# Patient Record
(demographics unavailable — no encounter records)

---

## 2024-12-13 NOTE — ASSESSMENT
[FreeTextEntry1] : take Augmentin as directed with food use Fluticasone nasal spray as directed maintain fluid hydration monitor heart rate at home

## 2024-12-13 NOTE — REVIEW OF SYSTEMS
[Cough] : cough [Fever] : no fever [Chills] : no chills [Shortness Of Breath] : no shortness of breath [FreeTextEntry4] : sinus congestion

## 2024-12-13 NOTE — PHYSICAL EXAM
[No Acute Distress] : no acute distress [Well Nourished] : well nourished [Well Developed] : well developed [Normal Appearance] : was normal in appearance [Neck Supple] : was supple [No Respiratory Distress] : no respiratory distress  [Clear to Auscultation] : lungs were clear to auscultation bilaterally [Regular Rhythm] : with a regular rhythm [Normal S1, S2] : normal S1 and S2 [No Murmur] : no murmur heard [Normal Anterior Cervical Nodes] : no anterior cervical lymphadenopathy [Alert and Oriented x3] : oriented to person, place, and time [Tachycardia] : tachycardic [de-identified] : Bilateral frontal and maxillary sinus tenderness; Oropharynx with mild erythema, no exudated, postnasal drip present

## 2024-12-13 NOTE — HISTORY OF PRESENT ILLNESS
[FreeTextEntry8] : 27 year old  presents c/o 12 day h/o sinus congestion and pressure along with a cough. No fever or chills. She has noticed her cough continues to be productive, sometimes feels chest tightness where she has needed to use her Albuterol inhaler at home. She did go to [facility] urgent care last week on 12/5/24- rapid strep test came back negative. Her home COVID testing has been negative. She was prescribed cough medication which provided mild relief.

## 2024-12-19 NOTE — ASSESSMENT
[FreeTextEntry1] : Pt evaluated and symptoms consistent with URI  discussed viral vs bacterial infections and discussed OTC treatment options including rest, fluids, hydration, motrin/tylenol PRN  Use stream inhalation and neti pot PRN discussed OTC remedies given work note f/u iif needed

## 2024-12-19 NOTE — REVIEW OF SYSTEMS
[Fever] : no fever [Fatigue] : fatigue [Nasal Discharge] : nasal discharge [Sore Throat] : sore throat [Cough] : cough

## 2024-12-19 NOTE — HISTORY OF PRESENT ILLNESS
[Home] : at home, [unfilled] , at the time of the visit. [Medical Office: ([facility])___] : at the medical office located in  [Verbal consent obtained from patient] : the patient, [unfilled] [FreeTextEntry8] : PT presenting for f.u of URI  was seen earlier in the week with similar symptoms, initially had a fever which has now subsided has cough which is persistent and bothersome needs note for RTW

## 2024-12-23 NOTE — ASSESSMENT
[FreeTextEntry1] : given persistent cough- referred for chest x-ray to r/o pneumonia will prescribe course of alternative antibiotics, take Azithromycin as directed with probiotics  complete course of Medrol dose pack c/w Fluticasone nasal spray

## 2024-12-23 NOTE — REVIEW OF SYSTEMS
[Postnasal Drip] : postnasal drip [Cough] : cough [Fever] : no fever [Chills] : no chills [Chest Pain] : no chest pain [Shortness Of Breath] : no shortness of breath

## 2024-12-23 NOTE — HISTORY OF PRESENT ILLNESS
[FreeTextEntry8] : 27 year old female presents c/o persistent cough. She was seen in the office 10 days ago- prescribed Augmentin and Fluticasone nasal spray for likely sinusitis. She has completed her antibiotics 4 days ago. She had a telehealth appointment 3 days ago- was prescribed a Medrol dose pack, on day #4 today.

## 2024-12-23 NOTE — PHYSICAL EXAM
[No Acute Distress] : no acute distress [Well Nourished] : well nourished [Well Developed] : well developed [Normal Oropharynx] : the oropharynx was normal [Normal TMs] : both tympanic membranes were normal [Normal Appearance] : was normal in appearance [Neck Supple] : was supple [No Respiratory Distress] : no respiratory distress  [Clear to Auscultation] : lungs were clear to auscultation bilaterally [Regular Rhythm] : with a regular rhythm [Normal S1, S2] : normal S1 and S2 [No Murmur] : no murmur heard [Tachycardia] : tachycardic [Normal Anterior Cervical Nodes] : no anterior cervical lymphadenopathy [Alert and Oriented x3] : oriented to person, place, and time [de-identified] : postnasal drip present [de-identified] : + cough

## 2025-01-31 NOTE — HISTORY OF PRESENT ILLNESS
[FreeTextEntry8] : 27 year old female presents for evaluation of a cough. She had been diagnosed with the Flu about 2 weeks ago- she had gone to an urgent care after developing a fever along with cough and congestion. She had been treating herself with over the counter medication including Dayquil, Nyquil, Ibuprofen. Most of her symptoms have resolved except she remains with a persistent cough and mild congestion. She had similar cough about a month ago- that eventually resolved and she had been well for a couple of weeks prior to nam the Flu. She continues to use Fluticasone nasal spray.

## 2025-01-31 NOTE — ASSESSMENT
[FreeTextEntry1] : s/p recent Flu, c/o cough vitals, exam stable take Medrol dose pack as directed c/w Fluticasone nasal spray trial of Guaifenesin with codeine, take as directed when needed, advised on drowsy side effect, not to drive while taking medication,  reviewed call back or f/u if symptoms worsen and/or don't improve

## 2025-01-31 NOTE — PHYSICAL EXAM
[No Acute Distress] : no acute distress [Well Nourished] : well nourished [Well Developed] : well developed [Normal Oropharynx] : the oropharynx was normal [Normal TMs] : both tympanic membranes were normal [Normal Appearance] : was normal in appearance [Neck Supple] : was supple [No Respiratory Distress] : no respiratory distress  [Clear to Auscultation] : lungs were clear to auscultation bilaterally [Normal Rate] : normal rate  [Regular Rhythm] : with a regular rhythm [Normal S1, S2] : normal S1 and S2 [No Murmur] : no murmur heard [Normal Anterior Cervical Nodes] : no anterior cervical lymphadenopathy [Alert and Oriented x3] : oriented to person, place, and time [de-identified] : postnasal drip present

## 2025-04-02 NOTE — PLAN
[FreeTextEntry1] : discussed in detail treatment for left ovarian cyst  ca 125/CBC obtained  she would be a great candidate for LESS Left cystectomy  Risks and benefits discussed in detail. see scanned in image patient to follow up for final decision for surgery pending pre op sono  obtain hematology recommendations  all of her questions regarding procedure were answered she is agreeable with plan   I Marguerite Burgos [facility] am scribing for the presence of Dr. Shen the following sections HISTORY OF PRESENT ILLNESS, PAST MEDICAL/FAMILY/SOCIAL HISTORY; REVIEW OF SYSTEMS; VITAL SIGNS; PHYSICAL EXAM; DISPOSITION. I personally performed the services described in the documentation, reviewed the documentation recorded by the scribe in my presence and it accurately and completely records my words and actions.

## 2025-04-02 NOTE — HISTORY OF PRESENT ILLNESS
[FreeTextEntry1] : Patient is a 28yo  female here today for initial visit for surgical consult for ovarian cyst. TVUS done at [facility] 2025 left ovarian simple cyst 6.1cm.   Hx of tear duct surgery, prothrombin gene II

## 2025-04-02 NOTE — PHYSICAL EXAM
[Chaperone Present] : A chaperone was present in the examining room during all aspects of the physical examination [Labia Majora] : normal [Labia Minora] : normal [Normal] : normal [Uterine Adnexae] : normal [FreeTextEntry2] : Delmi FNP-BC [FreeTextEntry6] : left adnexal cystic mass smooth non tender

## 2025-04-02 NOTE — PLAN
[FreeTextEntry1] : discussed in detail treatment for left ovarian cyst  ca 125/CBC obtained  she would be a great candidate for LESS Left cystectomy  Risks and benefits discussed in detail. see scanned in image patient to follow up for final decision for surgery pending pre op sono  obtain hematology recommendations  all of her questions regarding procedure were answered she is agreeable with plan   I Marguerite Burgos [facility] am scribing for the presence of Dr. Shen the following sections HISTORY OF PRESENT ILLNESS, PAST MEDICAL/FAMILY/SOCIAL HISTORY; REVIEW OF SYSTEMS; VITAL SIGNS; PHYSICAL EXAM; DISPOSITION. I personally performed the services described in the documentation, reviewed the documentation recorded by the scribe in my presence and it accurately and completely records my words and actions.

## 2025-06-03 NOTE — PHYSICAL EXAM
[Appropriately responsive] : appropriately responsive [Alert] : alert [No Acute Distress] : no acute distress [No Lymphadenopathy] : no lymphadenopathy [Regular Rate Rhythm] : regular rate rhythm [No Murmurs] : no murmurs [Clear to Auscultation B/L] : clear to auscultation bilaterally [Soft] : soft [Non-tender] : non-tender [Non-distended] : non-distended [No HSM] : No HSM [No Lesions] : no lesions [No Mass] : no mass [Oriented x3] : oriented x3 [Labia Majora] : normal [Labia Minora] : normal [Normal] : normal [Uterine Adnexae] : normal [FreeTextEntry2] : Delmi FNP-BC [FreeTextEntry6] : left adnexal cystic mass smooth non tender

## 2025-06-03 NOTE — PLAN
[FreeTextEntry1] : plan LESS left ovarian cystectomy  Discussed pre op and post op instructions in detail zpack & oxycodone #4 ordered  Vaginal restrictions only  all of patients questions regarding procedure were answered. consent signed by MD, patient and witness. she is to f/u with me 2 weeks post operatively. she is agreeable with plan.   I Marguerite Burgos [facility] am scribing for the presence of Dr. Shen the following sections HISTORY OF PRESENT ILLNESS, PAST MEDICAL/FAMILY/SOCIAL HISTORY; REVIEW OF SYSTEMS; VITAL SIGNS; PHYSICAL EXAM; DISPOSITION. I personally performed the services described in the documentation, reviewed the documentation recorded by the scribe in my presence and it accurately and completely records my words and actions.

## 2025-06-03 NOTE — HISTORY OF PRESENT ILLNESS
[FreeTextEntry1] : Patient is a 26 yo female here today for final decision for surgery   referred by

## 2025-06-03 NOTE — PLAN
[FreeTextEntry1] : plan LESS left ovarian cystectomy  Discussed pre op and post op instructions in detail zpack & oxycodone #4 ordered  Vaginal restrictions only  all of patients questions regarding procedure were answered. consent signed by MD, patient and witness. she is to f/u with me 2 weeks post operatively. she is agreeable with plan.   I Marguerite Burgos [facility] am scribing for the presence of Dr. Shen the following sections HISTORY OF PRESENT ILLNESS, PAST MEDICAL/FAMILY/SOCIAL HISTORY; REVIEW OF SYSTEMS; VITAL SIGNS; PHYSICAL EXAM; DISPOSITION. I personally performed the services described in the documentation, reviewed the documentation recorded by the scribe in my presence and it accurately and completely records my words and actions.

## 2025-06-28 NOTE — HISTORY OF PRESENT ILLNESS
[FreeTextEntry1] : Annual physical  [de-identified] : 28 year old female presents for an annual physical. She currently feels well today. She was seen recently by her allergist- prescribed Albuterol inhaler and Arnuity Ellipta inhaler to take when needed for her asthma which is usually impacted by her allergies.   She has h/o Hyperlipidemia- likely familial, both of her parents, brother, maternal grandparents have hyperlipidemia  she reports her diet is generally good she is active- walks regularly, does yoga, works in a   She has h/o prothrombin gene mutation, follows with hematology/oncology, Dr. Salas  sees dermatology for skin examinations   up to date with GYN has IUD in place recently had left paratubal ovarian cystectomy in May 2025   reports up to date with vaccinations last Tetanus vaccine (TD) given in March 2018  has received Gardasil/HPV vaccinations

## 2025-06-28 NOTE — PHYSICAL EXAM
[No Acute Distress] : no acute distress [Well Nourished] : well nourished [Well Developed] : well developed [Well-Appearing] : well-appearing [Normal Sclera/Conjunctiva] : normal sclera/conjunctiva [PERRL] : pupils equal round and reactive to light [Normal Oropharynx] : the oropharynx was normal [Normal TMs] : both tympanic membranes were normal [Normal Appearance] : was normal in appearance [Neck Supple] : was supple [Normal] : the thyroid was normal [No Respiratory Distress] : no respiratory distress  [Clear to Auscultation] : lungs were clear to auscultation bilaterally [Normal Rate] : normal rate  [Regular Rhythm] : with a regular rhythm [Normal S1, S2] : normal S1 and S2 [No Murmur] : no murmur heard [No Carotid Bruits] : no carotid bruits [No Edema] : there was no peripheral edema [Soft] : abdomen soft [Non Tender] : non-tender [Normal Bowel Sounds] : normal bowel sounds [Normal Posterior Cervical Nodes] : no posterior cervical lymphadenopathy [Normal Anterior Cervical Nodes] : no anterior cervical lymphadenopathy [Grossly Normal Strength/Tone] : grossly normal strength/tone [No Rash] : no rash [No Focal Deficits] : no focal deficits [Alert and Oriented x3] : oriented to person, place, and time [Normal Mood] : the mood was normal [Normal Insight/Judgement] : insight and judgment were intact [de-identified] : multiple nevi

## 2025-06-28 NOTE — ASSESSMENT
[Vaccines Reviewed] : Immunizations reviewed today. Please see immunization details in the vaccine log within the immunization flowsheet.  [FreeTextEntry1] : Health care maintenance: check blood work, blood drawn in office follow up with optometry/ophthalmology and dentist for routine exams when due follow up with dermatology when due  up to date with GYN  up to date with vaccinations  Hyperlipidemia: c/w diet and exercise as tolerated check blood work  Prothrombin mutation: following with hematology/oncology  Allergic asthma: following with an allergist has inhalers on hand to use PRN

## 2025-06-28 NOTE — HEALTH RISK ASSESSMENT
[Yes] : Yes [Monthly or less (1 pt)] : Monthly or less (1 point) [1 or 2 (0 pts)] : 1 or 2 (0 points) [Never (0 pts)] : Never (0 points) [No] : In the past 12 months have you used drugs other than those required for medical reasons? No [Never] : Never [Patient reported PAP Smear was normal] : Patient reported PAP Smear was normal [de-identified] : rare alcohol use [Audit-CScore] : 1 [de-identified] : Walking, yoga, active at work  [de-identified] : Diet is generally good  [PapSmearDate] : 12/2024

## 2025-07-02 NOTE — HISTORY OF PRESENT ILLNESS
[de-identified] :  27 year old female presents today for post op examination she is s/p LESS LEFT PARATUBAL CYSTECTOMY 5/29/25

## 2025-07-02 NOTE — HISTORY OF PRESENT ILLNESS
[de-identified] :  27 year old female presents today for post op examination she is s/p LESS LEFT PARATUBAL CYSTECTOMY 5/29/25

## 2025-07-02 NOTE — PLAN
[FreeTextEntry1] : path and surgical photos reviewed Discussed fertility unchanged  patient to call me if she has any increased pain over the next few weeks follow up 3 weeks for final post op visit all of her questions were answered she is agreeable with plan    I Nicole SAEED am scribing for the presence of Dr. Nicho Shen the following sections HISTORY OF PRESENT ILLNESS, PAST MEDICAL/FAMILY/SOCIAL HISTORY; REVIEW OF SYSTEMS; VITAL SIGNS; PHYSICAL EXAM; DISPOSITION. I personally performed the services described in the documentation, reviewed the documentation recorded by the scribe in my presence and it accurately and completely records my words and actions.

## 2025-07-02 NOTE — HISTORY OF PRESENT ILLNESS
[de-identified] :  27 year old female presents today for post op examination she is s/p LESS LEFT PARATUBAL CYSTECTOMY 5/29/25

## 2025-07-15 NOTE — HISTORY OF PRESENT ILLNESS
[de-identified] : 27 year old female presents today for post op examination she is s/p LESS LEFT PARATUBAL CYSTECTOMY 5/29/25 c/o incomplete bladder emptying

## 2025-07-15 NOTE — PLAN
[None] : None [FreeTextEntry1] : Assessed incomplete bladder emptying can be related to post op inflammation. If doesn't improve in next 6 months may need further work up. Abdominal incision no S&s of infection Dictated note to Dr. Chambers and Dr. Guido Salas including operative note, pathology report and surgical photos patient to call me if she has any increased pain over the next few weeks follow up with Dr. Chambers for annual December all of her questions were answered she is agreeable with plan   I Nicole SAEED am scribing for the presence of Dr. Nicho Shen the following sections HISTORY OF PRESENT ILLNESS, PAST MEDICAL/FAMILY/SOCIAL HISTORY; REVIEW OF SYSTEMS; VITAL SIGNS; PHYSICAL EXAM; DISPOSITION. I personally performed the services described in the documentation, reviewed the documentation recorded by the scribe in my presence and it accurately and completely records my words and actions.

## 2025-07-15 NOTE — HISTORY OF PRESENT ILLNESS
[de-identified] : 27 year old female presents today for post op examination she is s/p LESS LEFT PARATUBAL CYSTECTOMY 5/29/25 c/o incomplete bladder emptying